# Patient Record
Sex: MALE | Race: WHITE | Employment: FULL TIME | URBAN - METROPOLITAN AREA
[De-identification: names, ages, dates, MRNs, and addresses within clinical notes are randomized per-mention and may not be internally consistent; named-entity substitution may affect disease eponyms.]

---
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
## 2022-07-22 ENCOUNTER — OFFICE VISIT (OUTPATIENT)
Dept: FAMILY MEDICINE CLINIC | Age: 22
End: 2022-07-22
Payer: MEDICARE

## 2022-07-22 VITALS
RESPIRATION RATE: 16 BRPM | TEMPERATURE: 98.1 F | WEIGHT: 115 LBS | OXYGEN SATURATION: 98 % | DIASTOLIC BLOOD PRESSURE: 70 MMHG | HEIGHT: 68 IN | HEART RATE: 88 BPM | SYSTOLIC BLOOD PRESSURE: 106 MMHG | BODY MASS INDEX: 17.43 KG/M2

## 2022-07-22 DIAGNOSIS — M54.2 NECK PAIN: Primary | ICD-10-CM

## 2022-07-22 DIAGNOSIS — J30.2 SEASONAL ALLERGIES: ICD-10-CM

## 2022-07-22 DIAGNOSIS — F41.9 ANXIETY AND DEPRESSION: ICD-10-CM

## 2022-07-22 DIAGNOSIS — F32.A ANXIETY AND DEPRESSION: ICD-10-CM

## 2022-07-22 DIAGNOSIS — G89.29 CHRONIC MIDLINE LOW BACK PAIN WITHOUT SCIATICA: ICD-10-CM

## 2022-07-22 DIAGNOSIS — M54.50 CHRONIC MIDLINE LOW BACK PAIN WITHOUT SCIATICA: ICD-10-CM

## 2022-07-22 PROBLEM — H53.9 DISORDER OF VISION: Status: ACTIVE | Noted: 2020-09-25

## 2022-07-22 PROBLEM — R63.4 ABNORMAL WEIGHT LOSS: Status: ACTIVE | Noted: 2022-07-22

## 2022-07-22 PROCEDURE — 99204 OFFICE O/P NEW MOD 45 MIN: CPT | Performed by: NURSE PRACTITIONER

## 2022-07-22 PROCEDURE — G8419 CALC BMI OUT NRM PARAM NOF/U: HCPCS | Performed by: NURSE PRACTITIONER

## 2022-07-22 PROCEDURE — 1036F TOBACCO NON-USER: CPT | Performed by: NURSE PRACTITIONER

## 2022-07-22 PROCEDURE — G8427 DOCREV CUR MEDS BY ELIG CLIN: HCPCS | Performed by: NURSE PRACTITIONER

## 2022-07-22 RX ORDER — LORATADINE 10 MG/1
10 CAPSULE, LIQUID FILLED ORAL DAILY
COMMUNITY
End: 2022-07-22 | Stop reason: SDUPTHER

## 2022-07-22 RX ORDER — LORATADINE 10 MG/1
10 CAPSULE, LIQUID FILLED ORAL DAILY
Qty: 30 CAPSULE | Refills: 3 | Status: SHIPPED | OUTPATIENT
Start: 2022-07-22

## 2022-07-22 SDOH — ECONOMIC STABILITY: FOOD INSECURITY: WITHIN THE PAST 12 MONTHS, YOU WORRIED THAT YOUR FOOD WOULD RUN OUT BEFORE YOU GOT MONEY TO BUY MORE.: NEVER TRUE

## 2022-07-22 SDOH — ECONOMIC STABILITY: FOOD INSECURITY: WITHIN THE PAST 12 MONTHS, THE FOOD YOU BOUGHT JUST DIDN'T LAST AND YOU DIDN'T HAVE MONEY TO GET MORE.: NEVER TRUE

## 2022-07-22 ASSESSMENT — PATIENT HEALTH QUESTIONNAIRE - PHQ9
2. FEELING DOWN, DEPRESSED OR HOPELESS: 2
SUM OF ALL RESPONSES TO PHQ QUESTIONS 1-9: 2
SUM OF ALL RESPONSES TO PHQ9 QUESTIONS 1 & 2: 2
SUM OF ALL RESPONSES TO PHQ QUESTIONS 1-9: 2
1. LITTLE INTEREST OR PLEASURE IN DOING THINGS: 0

## 2022-07-22 ASSESSMENT — ENCOUNTER SYMPTOMS
SORE THROAT: 0
CONSTIPATION: 0
CHEST TIGHTNESS: 0
NAUSEA: 0
ABDOMINAL PAIN: 0
SHORTNESS OF BREATH: 0
RHINORRHEA: 0
BACK PAIN: 1
DIARRHEA: 0
COLOR CHANGE: 0
ABDOMINAL DISTENTION: 0
COUGH: 0

## 2022-07-22 ASSESSMENT — SOCIAL DETERMINANTS OF HEALTH (SDOH): HOW HARD IS IT FOR YOU TO PAY FOR THE VERY BASICS LIKE FOOD, HOUSING, MEDICAL CARE, AND HEATING?: NOT HARD AT ALL

## 2022-07-22 NOTE — PATIENT INSTRUCTIONS
Health Maintenance Recommendations  Exercise   I generally recommend that people of all ages try to get 150 minutes of physical activity per week and it doesnt matter how this totals up, in other words 30 minutes 5 days per week is as good as 50 minutes 3 days a week and so on. The level of activity should be such that it is able to get your heart rate up to 100 or more, for example a brisk walk should achieve this rate. Dietary Recommendations  In terms of diet, I generally recommend trying to eat a healthy well balanced diet full of fruits and vegetables. Avoid carbonated drinks and fruit juices and limit your alcohol use. Avoid processed foods wherever possible (anything that comes in a can or a box) which can be achieved by sticking to the outside walls of the grocery store where generally you will find fresh fruits/vegetables, meats, dairy, and frozen foods. Try to avoid starches in the diet where possible and minimize bread, rice, potatoes, and pasta in the diet. Specifically try to avoid gluten, which even in people that dont have a liv allergy, causes havoc in the small intestine and alters absorption of nutrients which can in turn lead to obesity. Sleep  Try to achieve a regular sleep schedule, waking and laying down at the same time each night. Most people need 7 hours per night plus or minus 2 hours. You will know that youre getting enough because you will wake feeling refreshed and not need to sleep in to catch up on weekends. Skin Care  Make sure that you dont neglect your skin. Play it safe in the sun. Use a sunblock on all of your exposed skin. The sunblock should be broad spectrum and water resistant. I do recommend an SPF 30 or higher sun screen any time that you plan to be in the sun for more than 20 minutes, even in the winter or on cloudy days (keep in mind that UV light penetrates clouds and can cause burns even on cloudy days).    Apply 20 to 30 minutes before going out in the sun. Reapply sunblock every 2 hours and after swimming or sweating. Kentrell Flores can also damage your skin on cool, windy days. Clouds and fog do not filter UV light. Make sure you reapply sunblock every 2 hours. Avoid the sun in the middle of the day, between 10 AM to 4 PM. Your unprotected skin can be damaged in 15 minutes with direct sun exposure. Personal Health  If you smoke, STOP. There are many resources available to help you successfully quit. If you are sexually active, always practice safe sex and wear a condom. See a dentist every 6 months. See an eye doctor regularly. Always wear a seat belt while in car. Get a flu vaccine annually. Get a tetanus booster vaccine every 10 years. Psychosocial Health  Finally, make sure that you always have something to look forward to whether this is a vacation, a special event, or just a weekend off work. Having something to look forward to helps to maintain positive focus and is good for mental health. Patient Education        Back Stretches: Exercises  Introduction  Here are some examples of exercises for stretching your back. Start eachexercise slowly. Ease off the exercise if you start to have pain. Your doctor or physical therapist will tell you when you can start theseexercises and which ones will work best for you. How to do the exercises  Overhead stretch    Stand comfortably with your feet shoulder-width apart. Looking straight ahead, raise both arms over your head and reach toward the ceiling. Do not allow your head to tilt back. Hold for 15 to 30 seconds, then lower your arms to your sides. Repeat 2 to 4 times. Side stretch    Stand comfortably with your feet shoulder-width apart. Raise one arm over your head, and then lean to the other side. Slide your hand down your leg as you let the weight of your arm gently stretch your side muscles. Hold for 15 to 30 seconds. Repeat 2 to 4 times on each side.   Press-up    Lie on your stomach, supporting your body with your forearms. Press your elbows down into the floor to raise your upper back. As you do this, relax your stomach muscles and allow your back to arch without using your back muscles. As your press up, do not let your hips or pelvis come off the floor. Hold for 15 to 30 seconds, then relax. Repeat 2 to 4 times. Relax and rest    Lie on your back with a rolled towel under your neck and a pillow under your knees. Extend your arms comfortably to your sides. Relax and breathe normally. Remain in this position for about 10 minutes. If you can, do this 2 or 3 times each day. Follow-up care is a key part of your treatment and safety. Be sure to make and go to all appointments, and call your doctor if you are having problems. It's also a good idea to know your test results and keep alist of the medicines you take. Where can you learn more? Go to [email or web address]://[email or web address] org and sign in to your [employer] account. Enter I082 in the [employer] box to learn more about \"Back Stretches: Exercises. \"     If you do not have an account, please click on the \"Sign Up Now\" link. Current as of: March 9, 2022               Content Version: 13.3  © 2006-2022 Healthwise, Incorporated. Care instructions adapted under license by [facility] ([facility]). If you have questions about a medical condition or this instruction, always ask your healthcare professional. [place] [address] any warranty or liability for your use of this information.

## 2022-07-22 NOTE — PROGRESS NOTES
dysuria. Musculoskeletal:  Positive for back pain and neck pain. Negative for arthralgias and myalgias. Skin:  Negative for color change and rash. Neurological:  Negative for dizziness, weakness, light-headedness and headaches. Hematological:  Negative for adenopathy. Psychiatric/Behavioral:  Positive for dysphoric mood. Negative for agitation and behavioral problems. The patient is nervous/anxious. Vitals:    07/22/22 1413   BP: 106/70   Pulse: 88   Resp: 16   Temp: 98.1 °F (36.7 °C)   SpO2: 98%   '  Objective:     Physical Exam  Vitals reviewed. Constitutional:       General: He is not in acute distress. Appearance: Normal appearance. HENT:      Head: Normocephalic and atraumatic. Right Ear: External ear normal.      Left Ear: External ear normal.      Nose: Nose normal.      Mouth/Throat:      Mouth: Mucous membranes are moist.      Pharynx: No oropharyngeal exudate or posterior oropharyngeal erythema. Eyes:      Extraocular Movements: Extraocular movements intact. Conjunctiva/sclera: Conjunctivae normal.      Pupils: Pupils are equal, round, and reactive to light. Cardiovascular:      Rate and Rhythm: Normal rate and regular rhythm. Pulses: Normal pulses. Heart sounds: Normal heart sounds. No murmur heard. Pulmonary:      Effort: Pulmonary effort is normal. No respiratory distress. Breath sounds: Normal breath sounds. No wheezing or rales. Abdominal:      General: Bowel sounds are normal. There is no distension. Palpations: Abdomen is soft. Tenderness: There is no abdominal tenderness. Musculoskeletal:      Cervical back: Normal range of motion. Pain with movement present. Thoracic back: Decreased range of motion. Lumbar back: Normal.      Right lower leg: No edema. Left lower leg: No edema. Lymphadenopathy:      Cervical: No cervical adenopathy. Skin:     General: Skin is warm and dry.    Neurological:      General: No focal

## 2022-07-26 ENCOUNTER — HOSPITAL ENCOUNTER (OUTPATIENT)
Dept: PHYSICAL THERAPY | Facility: CLINIC | Age: 22
Setting detail: THERAPIES SERIES
Discharge: HOME | End: 2022-07-26
Payer: MEDICARE

## 2022-07-26 PROCEDURE — 97110 THERAPEUTIC EXERCISES: CPT

## 2022-07-26 PROCEDURE — 97161 PT EVAL LOW COMPLEX 20 MIN: CPT
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
## 2022-07-27 ENCOUNTER — OFFICE VISIT (OUTPATIENT)
Dept: BEHAVIORAL/MENTAL HEALTH CLINIC | Age: 22
End: 2022-07-27
Payer: MEDICARE

## 2022-07-27 DIAGNOSIS — F41.1 GENERALIZED ANXIETY DISORDER: Primary | ICD-10-CM

## 2022-07-27 DIAGNOSIS — F34.1 PERSISTENT DEPRESSIVE DISORDER: ICD-10-CM

## 2022-07-27 PROCEDURE — 90791 PSYCH DIAGNOSTIC EVALUATION: CPT | Performed by: COUNSELOR

## 2022-07-27 ASSESSMENT — ANXIETY QUESTIONNAIRES
6. BECOMING EASILY ANNOYED OR IRRITABLE: 3
IF YOU CHECKED OFF ANY PROBLEMS ON THIS QUESTIONNAIRE, HOW DIFFICULT HAVE THESE PROBLEMS MADE IT FOR YOU TO DO YOUR WORK, TAKE CARE OF THINGS AT HOME, OR GET ALONG WITH OTHER PEOPLE: VERY DIFFICULT
1. FEELING NERVOUS, ANXIOUS, OR ON EDGE: 2
3. WORRYING TOO MUCH ABOUT DIFFERENT THINGS: 2
GAD7 TOTAL SCORE: 14
5. BEING SO RESTLESS THAT IT IS HARD TO SIT STILL: 1
7. FEELING AFRAID AS IF SOMETHING AWFUL MIGHT HAPPEN: 1
4. TROUBLE RELAXING: 2
2. NOT BEING ABLE TO STOP OR CONTROL WORRYING: 3

## 2022-07-27 ASSESSMENT — PATIENT HEALTH QUESTIONNAIRE - PHQ9
SUM OF ALL RESPONSES TO PHQ QUESTIONS 1-9: 15
7. TROUBLE CONCENTRATING ON THINGS, SUCH AS READING THE NEWSPAPER OR WATCHING TELEVISION: 2
6. FEELING BAD ABOUT YOURSELF - OR THAT YOU ARE A FAILURE OR HAVE LET YOURSELF OR YOUR FAMILY DOWN: 1
10. IF YOU CHECKED OFF ANY PROBLEMS, HOW DIFFICULT HAVE THESE PROBLEMS MADE IT FOR YOU TO DO YOUR WORK, TAKE CARE OF THINGS AT HOME, OR GET ALONG WITH OTHER PEOPLE: 3
5. POOR APPETITE OR OVEREATING: 3
SUM OF ALL RESPONSES TO PHQ QUESTIONS 1-9: 15
1. LITTLE INTEREST OR PLEASURE IN DOING THINGS: 2
3. TROUBLE FALLING OR STAYING ASLEEP: 2
4. FEELING TIRED OR HAVING LITTLE ENERGY: 2
9. THOUGHTS THAT YOU WOULD BE BETTER OFF DEAD, OR OF HURTING YOURSELF: 0
SUM OF ALL RESPONSES TO PHQ QUESTIONS 1-9: 15
2. FEELING DOWN, DEPRESSED OR HOPELESS: 2
SUM OF ALL RESPONSES TO PHQ QUESTIONS 1-9: 15
8. MOVING OR SPEAKING SO SLOWLY THAT OTHER PEOPLE COULD HAVE NOTICED. OR THE OPPOSITE, BEING SO FIGETY OR RESTLESS THAT YOU HAVE BEEN MOVING AROUND A LOT MORE THAN USUAL: 1
SUM OF ALL RESPONSES TO PHQ9 QUESTIONS 1 & 2: 4

## 2022-07-27 NOTE — PROGRESS NOTES
ADULT BEHAVIORAL HEALTH       Visit Date: 7/29/2022   Time of appointment:  1:00 PM   Time spent with Patient: 50 minutes. This is patient's Initial appointment. Reason for Consult:    Chief Complaint   Patient presents with    Anxiety    Depression         Referring Provider/PCP:    DAVI Segundo *  DAVI Becerra - CNP      Pt provided informed consent for the behavioral health program. Discussed with patient model of service to include the limits of confidentiality (i.e. abuse reporting, suicide intervention, etc.) and short-term intervention focused approach. Pt indicated understanding. PRESENTING PROBLEM AND HISTORY  Luis Enrique Mendez is a 25 y.o. male who presents for new evaluation and treatment of  anxiety, depression. Anxiety - overthinking, think of all the worst case scenarios, withdraw and isolate. Depression - avoid people, does not respond to text messages. He misses his daughter when he is not with her. He has the following symptoms: depressed mood, decreased appetite, weight loss, decreased sleep, fatigue/lack of energy, low self-esteem, isolating self, feelings of worthlessness, anger/irritability, feeling nervous, anxious, or on edge, excessive worry about a number of events or activities , avoidance of situations that provoke fear and anxiety, difficulty with attention/concentration, and strained coparenting relationship with ex / daughter's mother, lived with maternal grandmother for 1 year at 26years old due to \"bumping heads\" with his father, emotional needs not being met by parents as a child, and break up with long distance girlfriend of two years at age approximately 13years old. Which impacted him emotionally and mentally. Onset of symptoms was approximately several years ago. Symptoms have been gradually worsening since that time. He denies current suicidal and homicidal ideation.   Family history significant for alcoholism, anxiety, heart disease, and Bipolar Disorder . Risk factors: none. Previous treatment includes  none . He complains of the following medication side effects: none. MENTAL STATUS EXAM  Mood was euthymic with anxious affect. Suicidal ideation was denied. Homicidal ideation was denied. Hygiene was good . Dress was appropriate. Behavior was Within Normal Limits with No observation or self-report of difficulties ambulating. Attitude was Cooperative. Eye-contact was good. Speech: rate - WNL, rhythm -  WNL, volume - WNL  Verbalizations were coherent. Thought processes were intact and goal-oriented without evidence of delusions, hallucinations, obsessions, or william; with little cognitive distortions. Associations were characterized by intact cognitive processes. Pt was oriented to person, place, time, and general circumstances;  recent:  good and remote:  good. Insight and judgment were estimated to be fair, AEB, a fair  understanding of cyclical maladaptive patterns, and the ability to use insight to inform behavior change. CURRENT MEDICATIONS    Current Outpatient Medications:     loratadine (CLARITIN) 10 MG capsule, Take 1 capsule by mouth in the morning., Disp: 30 capsule, Rfl: 3     FAMILY MEDICAL/MH HISTORY   His family history is not on file. [employer] / one appointment following break up with long distance girlfriend. Denied history of psychiatric hospitalizations. Denied any firearms in the home. Has a [employer] sword (in a closest on top shelf). PSYCHOSOCIAL HISTORY  Current living situation: Georgia Diaz resides with his girlfriend (Thomasina Apgar) in a one bedroom apartment. His daughter Tracee Jimenez (4) visits every other weekend and has her own bed. Two guinea pigs. He has dated his girlfriend 4.5 years. Work/Education: Unemployed. Graduated in  from [employer]. He has previously worked at [employer].   He would like to go to college but does not know what he wants to study. Support system: Girlfriend, daughter, mother     Gnosticism/Spirituality: None. DRUG AND ALCOHOL CURRENT USE/HISTORY  TOBACCO:  He reports that he has never smoked. He has never used smokeless tobacco.      ALCOHOL:  He reports current alcohol use. OTHER SUBSTANCES: He reports current drug use. Drug: Marijuana [place]). Vapes - and reports history of smoking 4 blunts a day at highest.  He smokes to cope with stress. He reports being approved for medical marijuana card for neck/pain in [facility] but is having difficulty paying the fee for the card. He reports experiencing anxiety following use of cannabis. ASSESSMENT  Steven Yates presented to the appointment today for evaluation and treatment of symptoms of    Diagnosis Orders   1. Generalized anxiety disorder        2. Persistent depressive disorder             He is currently deemed no risk to himself or others and meets criteria for Generalized Anxiety Disorder and Persistent Depressive Disorder. He may benefit from a medication evaluation to assess if  medications could be helpful in treating anxiety and depressive symptoms. He is currently using cannabis to cope with symptoms and for pain in neck and back. Emile's anxiety and depressive symptoms are mildly controlled at this time. He will also benefit from brief and solution-focused consultation to address cognitive and behavioral interventions for anxiety and depressive symptoms. Forrest Hernandez was in agreement with recommendations.       PHQ Scores 7/27/2022 7/22/2022   PHQ2 Score 4 2   PHQ9 Score 15 2     Interpretation of Total Score Depression Severity: 1-4 = Minimal depression, 5-9 = Mild depression, 10-14 = Moderate depression, 15-19 = Moderately severe depression, 20-27 = Severe depression    How often pt has had thoughts of death or hurting self (if PHQ positive for depression):  Not at all    SITA 7 SCORE 7/27/2022   SITA-7 Total Score 14     Interpretation of SITA-7 score: 5-9 = mild anxiety, 10-14 = moderate anxiety, 15+ = severe anxiety. Recommend referral to behavioral health for scores 10 or greater. DIAGNOSIS/Plan  1. Generalized anxiety disorder  2. Persistent depressive disorder   R/O Cannabis Use Disorder     Return in about 1 week (around 8/3/2022). Celia Plascencia is agreeable to counseling to improve his mood and coping skills. Reduce amount of cannabis using to improve mood. INTERVENTION  Developed Crisis Response Plan, Established rapport, and Supportive techniques Completed diagnostic assessment, educated on limits and perimeters of confidentiality. INTERACTIVE COMPLEXITY  Is interactive complexity present?   No  Reason:  N/A  Additional Supporting Information:  N/A     Electronically signed by LADONNA Machado on 7/27/22 at 1:11 PM EDT

## 2022-07-27 NOTE — Clinical Note
Thank you for the referral.  Etelvina Chang is agreeable to counseling. He reports being approved for a medical marijuana card to treat neck pain but has not paid fee. Is using marijuana to cope with stress but it increases anxiety for him.

## 2022-07-29 ENCOUNTER — HOSPITAL ENCOUNTER (OUTPATIENT)
Dept: PHYSICAL THERAPY | Facility: CLINIC | Age: 22
Setting detail: THERAPIES SERIES
Discharge: HOME | End: 2022-07-29
Payer: MEDICARE

## 2022-07-29 NOTE — FLOWSHEET NOTE
[] [address]  [address]    [phone]  F: [phone]   [] [address]  [facility] 36   [address]  P: [phone]  F: [phone]  [] [address]  [address] [facility]  P: [phone]  F: [phone] [x] [address]  P: [phone]  F: [phone]  [] [address]  [address] [facility] [address]   [address]   Prachi Henriquez: [phone]  F: [phone]   [] [place] [address] [facility] [address]  Prachi Henriquez: [phone]   F: [phone]     Physical Therapy Cancel/No Show note    Date: 2022  Patient: Devan Guzman  : 2000  MRN: [id number]    Cancels/No Shows to date:     For today's appointment patient:    [x]  Cancelled    [] Rescheduled appointment    [] No-show     Reason given by patient:    []  Patient ill    []  Conflicting appointment    [] No transportation      [] Conflict with work    [] No reason given    [] Weather related    [] COVID-19    [x] Other:      Comments:  PT had to pick his daughter up early. Next appt conf.       [x] Next appointment was confirmed    Electronically signed by: Lord Velazquez

## 2022-08-02 ENCOUNTER — HOSPITAL ENCOUNTER (OUTPATIENT)
Dept: PHYSICAL THERAPY | Facility: CLINIC | Age: 22
Setting detail: THERAPIES SERIES
Discharge: HOME | End: 2022-08-02
Payer: MEDICARE

## 2022-08-02 PROCEDURE — 97110 THERAPEUTIC EXERCISES: CPT

## 2022-08-02 NOTE — FLOWSHEET NOTE
[] [facility]) [facility] &  Therapy  [address]  [phone]  F: [phone] [] [address]  [place] [address]   [address]  P: [phone]  F: [phone] [] [address]  [address]  P: [phone]  F: [phone] [] [address]  P: [phone]  F: [phone] [] [address]  [facility]   [address]   [place]: [phone]  F: [phone]      Physical Therapy Daily Treatment Note    Date:  2022  Patient Name:  Vanessa Deluca    :  2000  MRN: [id number]  Physician: Alicia TOMLINSNO CNP                            Insurance: [place] Advantage  Medical Diagnosis: Neck pain, chronic midline LBP                       Rehab Codes: M54.2, M54.4  Onset Date: 22                           Next 's appt. :  Visit# / total visits:     Cancels/No Shows: 0    Subjective:    Pain:  [x] Yes  [] No Location: R UT Pain Rating: (0-10 scale) 6/10  Pain altered Tx:  [] No  [] Yes  Action:  Comments: Pt reports no significant changes since initial evaluation. States he is having pain in R UT region.      Objective:  Modalities: none  Precautions: standard  Exercises:  Exercise Reps/ Time Weight/ Level Completed Comments   UBE 4' L1 x                 Prone           ITY 2x10 ea No weight x     ER at 90/90 *         Sidelying           ER x20 2# x     Gym           ABC on wall 1x ea Red weighted ball  x 90 and 120 deg flex/90 deg abd   Front to side plank Attempted - unable         Shrugs x20 8# x     Rows/retractions 2x15 blue x     Tband HAB 2x15 lime x     HAB on wall  X20 ea lime x Lateral and scaption   BOSU rocks X20 ea   x Kneeling, 4 way   Other:     Specific Instructions for next treatment:   Add ex and issue HEP    Treatment Charges: Mins Units   []  Modalities     [x]  Ther Exercise 45 3   []  Manual Therapy     []  Ther Activities     []  Aquatics     []  Vasocompression     []  Other     Total Treatment time 45 3       Assessment: [x] Progressing toward goals. Added scapular strengthening and stability ex as noted above. Cues provided for proper scapular positioning and to avoid compensations d/t weakness. Able to complete all ex without any increase in pain only muscle fatigue. Issued HEP detailed below. [] No change. [] Other:  [x] Patient would continue to benefit from skilled physical therapy services in order to: meet goals listed below  STG: (to be met in 6 treatments)  ? Pain:<4/10  ? Strength: 5/5 with shoulder flexion and abd  ? Function: <20% interference on NDI  Patient to be independent with home exercise program as demonstrated by performance with correct form without cues. LTG: (to be met in 12 treatments)  Pain <2/10  NDI <15% interference       Pt. Education:  [x] Yes  [] No  [] Reviewed Prior HEP/Ed  Method of Education: [x] Verbal  [x] Demo  [x] Written  Comprehension of Education:  [x] Verbalizes understanding. [x] Demonstrates understanding. [] Needs review. [] Demonstrates/verbalizes HEP/Ed previously given. Plan: [x] Continue current frequency toward long and short term goals.     [] Specific Instructions for subsequent treatments:       Time In: 1:00 pm            Time Out: 1:50 pm    Electronically signed by:  Johnson VIJI

## 2022-08-03 ENCOUNTER — TELEPHONE (OUTPATIENT)
Dept: BEHAVIORAL/MENTAL HEALTH CLINIC | Age: 22
End: 2022-08-03

## 2022-08-03 NOTE — TELEPHONE ENCOUNTER
Contacted patient today in regards to missed appointment. Left voicemail. Was call completed? If not, reason: Call was completed    Does patient want to continue services? If no, and the patient is a new patient, please close the referral. Also, if no, please document reason and route message to provider. patient did not answer    Anything the provider should be aware of? If yes, please route call.  No    Reason for Missed Appointment: patient did not answer call

## 2022-08-05 ENCOUNTER — HOSPITAL ENCOUNTER (OUTPATIENT)
Dept: PHYSICAL THERAPY | Facility: CLINIC | Age: 22
Setting detail: THERAPIES SERIES
Discharge: HOME | End: 2022-08-05
Payer: MEDICARE
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
## 2022-08-09 ENCOUNTER — HOSPITAL ENCOUNTER (OUTPATIENT)
Dept: PHYSICAL THERAPY | Facility: CLINIC | Age: 22
Setting detail: THERAPIES SERIES
Discharge: HOME | End: 2022-08-09
Payer: MEDICARE

## 2022-08-09 NOTE — FLOWSHEET NOTE
[] [address]  [address]    [phone]  F: [phone]   [] [address]  [address] [email or web address]   [address]  P: [phone]  F: [phone]  [] [address]  [address] [facility]  P: [phone]  F: [phone] [x] [address]  P: [phone]  F: [phone]  [] [address]  [address] [facility] 70   [address]   [place]: [phone]  F: [phone]   [] [facility]  [address] [facility] [address]  [place]: [phone]   F: [phone]     Physical Therapy Cancel/No Show note    Date: 2022  Patient: Isabella Medina  : 2000  MRN: [id number]    Cancels/No Shows to date: 3/0    For today's appointment patient:    [x]  Cancelled    [] Rescheduled appointment    [] No-show     Reason given by patient:    []  Patient ill    []  Conflicting appointment    [] No transportation      [] Conflict with work    [] No reason given    [] Weather related    [] COVID-19    [x] Other:      Comments: Pt's daughter has COVID.  Next appt conf pending a negative COVID test.      [x] Next appointment was confirmed    Electronically signed by: Pily Mccray

## 2022-08-12 ENCOUNTER — HOSPITAL ENCOUNTER (OUTPATIENT)
Dept: PHYSICAL THERAPY | Facility: CLINIC | Age: 22
Setting detail: THERAPIES SERIES
Discharge: HOME | End: 2022-08-12
Payer: MEDICARE

## 2022-08-12 NOTE — FLOWSHEET NOTE
[] [address]  [address]    [phone]  F: [phone]   [] [address]  [facility]   [address]  P: [phone]  F: [phone]  [] [address]  [address] [facility]  P: [phone]  F: [phone] [x] [address] [employer] [address]  P: [phone]  F: [phone]  [] [address]  [address] [facility] [address]   [address]   [place]: [phone]  F: [phone]   [] [address]  [place]: [phone]   F: [phone]     Physical Therapy Cancel/No Show note    Date: 2022  Patient: Isabella Medina  : 2000  MRN: [id number]    Cancels/No Shows to date:     For today's appointment patient:    [x]  Cancelled    [] Rescheduled appointment    [] No-show     Reason given by patient:    []  Patient ill    []  Conflicting appointment    [] No transportation      [] Conflict with work    [] No reason given    [] Weather related    [] COVID-19    [x] Other:      Comments: Pt states is awaiting results of COVID test. Will call back to reschedule.        [x] Next appointment was confirmed    Electronically signed by: Matias Payne PT

## 2024-12-29 ENCOUNTER — HOSPITAL ENCOUNTER (EMERGENCY)
Facility: CLINIC | Age: 24
Discharge: HOME | End: 2024-12-29
Attending: EMERGENCY MEDICINE
Payer: MEDICAID

## 2024-12-29 VITALS
HEIGHT: 69 IN | HEART RATE: 100 BPM | SYSTOLIC BLOOD PRESSURE: 140 MMHG | RESPIRATION RATE: 14 BRPM | BODY MASS INDEX: 18.51 KG/M2 | DIASTOLIC BLOOD PRESSURE: 85 MMHG | WEIGHT: 125 LBS | OXYGEN SATURATION: 99 % | TEMPERATURE: 98.6 F

## 2024-12-29 DIAGNOSIS — K02.9 PAIN DUE TO DENTAL CARIES: Primary | ICD-10-CM

## 2024-12-29 PROCEDURE — 99283 EMERGENCY DEPT VISIT LOW MDM: CPT

## 2024-12-29 PROCEDURE — 6370000000 HC RX 637 (ALT 250 FOR IP): Performed by: EMERGENCY MEDICINE

## 2024-12-29 RX ORDER — IBUPROFEN 800 MG/1
800 TABLET, FILM COATED ORAL EVERY 8 HOURS PRN
Qty: 60 TABLET | Refills: 0 | Status: SHIPPED | OUTPATIENT
Start: 2024-12-29

## 2024-12-29 RX ORDER — IBUPROFEN 800 MG/1
800 TABLET, FILM COATED ORAL ONCE
Status: COMPLETED | OUTPATIENT
Start: 2024-12-29 | End: 2024-12-29

## 2024-12-29 RX ADMIN — IBUPROFEN 800 MG: 800 TABLET ORAL at 21:32

## 2024-12-29 ASSESSMENT — PAIN DESCRIPTION - ORIENTATION: ORIENTATION: LEFT;UPPER

## 2024-12-29 ASSESSMENT — PAIN DESCRIPTION - DESCRIPTORS: DESCRIPTORS: ACHING

## 2024-12-29 ASSESSMENT — PAIN DESCRIPTION - LOCATION: LOCATION: TEETH

## 2024-12-29 ASSESSMENT — PAIN - FUNCTIONAL ASSESSMENT: PAIN_FUNCTIONAL_ASSESSMENT: 0-10

## 2024-12-29 ASSESSMENT — PAIN SCALES - GENERAL: PAINLEVEL_OUTOF10: 9

## 2024-12-30 NOTE — ED NOTES
Pt presents to ED ambulatory a&O x4. Pt states he has broken L upper tooth but over the past few days the pain has gotten worse. Pt has appointment with dentist at the end of next month. Has been taking motrin without relief.

## 2024-12-30 NOTE — ED PROVIDER NOTES
[facility]  [address] [address]  Phone: [phone]      Pt Name: Emile Smith  MRN:[id number]  Birthdate 2000  Date of evaluation: 12/29/2024      CHIEF COMPLAINT       Chief Complaint   Patient presents with    Dental Pain     Has broke tooth, worse the last few days         HISTORY OF PRESENT ILLNESS   Emile Smith is a 24 y.o. male who presents for evaluation of acute on chronic dental pain.  The patient reports that he has had chronic pain to one of his left upper molars for months.  The tooth is broken and he is scheduled for a tooth extraction on 1/30/2024.  The patient is already on clindamycin for his dental pain.  He states that his pain increased this morning.  He has been applying Orajel with some improvement.  He ran out of ibuprofen has not taken any oral medications for pain.  The patient states that drinking water does seem to help with the dental pain.  He does not list any other provoking or palliating factors.  The patient denies fever, chills, headache, vision changes, neck pain, back pain, chest pain, shortness of breath, abdominal pain, nausea, vomiting, difficulty swallowing, recent injury or illness.    REVIEW OF SYSTEMS     Positive: Dental pain  Ten point review of systems was reviewed and is negative unless otherwise noted in the HPI    PAST MEDICAL HISTORY    has no past medical history on file.    SURGICAL HISTORY      has no past surgical history on file.    CURRENT MEDICATIONS       Discharge Medication List as of 12/29/2024  9:29 PM        CONTINUE these medications which have NOT CHANGED    Details   loratadine (CLARITIN) 10 MG capsule Take 1 capsule by mouth in the morning., Disp-30 capsule, R-3Normal             ALLERGIES     has No Known Allergies.    FAMILY HISTORY     has no family status information on file.      family history is not on file.    SOCIAL HISTORY      reports that he has never smoked. He has never used smokeless tobacco.

## 2025-02-25 ENCOUNTER — HOSPITAL ENCOUNTER (EMERGENCY)
Facility: CLINIC | Age: 25
Discharge: HOME | End: 2025-02-25
Attending: STUDENT IN AN ORGANIZED HEALTH CARE EDUCATION/TRAINING PROGRAM
Payer: MEDICAID

## 2025-02-25 VITALS
HEIGHT: 68 IN | HEART RATE: 98 BPM | SYSTOLIC BLOOD PRESSURE: 134 MMHG | BODY MASS INDEX: 19.4 KG/M2 | WEIGHT: 128 LBS | OXYGEN SATURATION: 99 % | DIASTOLIC BLOOD PRESSURE: 92 MMHG | TEMPERATURE: 97.3 F | RESPIRATION RATE: 20 BRPM

## 2025-02-25 DIAGNOSIS — R11.2 NAUSEA AND VOMITING, UNSPECIFIED VOMITING TYPE: Primary | ICD-10-CM

## 2025-02-25 PROCEDURE — 99283 EMERGENCY DEPT VISIT LOW MDM: CPT

## 2025-02-25 PROCEDURE — 6370000000 HC RX 637 (ALT 250 FOR IP): Performed by: STUDENT IN AN ORGANIZED HEALTH CARE EDUCATION/TRAINING PROGRAM

## 2025-02-25 RX ORDER — CETIRIZINE HYDROCHLORIDE 10 MG/1
10 TABLET ORAL DAILY
COMMUNITY
Start: 2024-12-23 | End: 2025-03-23

## 2025-02-25 RX ORDER — ONDANSETRON 4 MG/1
4 TABLET, ORALLY DISINTEGRATING ORAL ONCE
Status: COMPLETED | OUTPATIENT
Start: 2025-02-25 | End: 2025-02-25

## 2025-02-25 RX ORDER — ONDANSETRON 4 MG/1
4 TABLET, ORALLY DISINTEGRATING ORAL 3 TIMES DAILY PRN
Qty: 21 TABLET | Refills: 0 | Status: SHIPPED | OUTPATIENT
Start: 2025-02-25

## 2025-02-25 RX ORDER — ONDANSETRON 2 MG/ML
4 INJECTION INTRAMUSCULAR; INTRAVENOUS ONCE
Status: DISCONTINUED | OUTPATIENT
Start: 2025-02-25 | End: 2025-02-25

## 2025-02-25 RX ORDER — FAMOTIDINE 20 MG/1
20 TABLET, FILM COATED ORAL ONCE
Status: COMPLETED | OUTPATIENT
Start: 2025-02-25 | End: 2025-02-25

## 2025-02-25 RX ORDER — CLINDAMYCIN HYDROCHLORIDE 300 MG/1
300 CAPSULE ORAL 3 TIMES DAILY
COMMUNITY
Start: 2025-02-23 | End: 2025-03-02

## 2025-02-25 RX ADMIN — FAMOTIDINE 20 MG: 20 TABLET, FILM COATED ORAL at 21:15

## 2025-02-25 RX ADMIN — ONDANSETRON 4 MG: 4 TABLET, ORALLY DISINTEGRATING ORAL at 21:15

## 2025-02-25 ASSESSMENT — PAIN DESCRIPTION - LOCATION: LOCATION: ABDOMEN

## 2025-02-25 ASSESSMENT — PAIN - FUNCTIONAL ASSESSMENT: PAIN_FUNCTIONAL_ASSESSMENT: 0-10

## 2025-02-25 ASSESSMENT — PAIN SCALES - GENERAL: PAINLEVEL_OUTOF10: 4

## 2025-02-26 NOTE — ED NOTES
Pt came in with c/o vomiting and abdominal pain that started this morning. He states symptoms have mostly resolved by now but he missed work today. He was around a sick family member but also took antibiotics on an empty stomach this morning. He ate within the last couple hours and has been able to hold it down. Call light within reach and bed in lowest position.

## 2025-02-26 NOTE — ED PROVIDER NOTES
for Nausea or Vomiting       (Please note that portions of this note were completed with a voice recognition program.  Efforts were made to edit the dictations but occasionally words are mis-transcribed.  Additionally, portions of this note may also include information that was incorporated after care transfer to another provider that were not available at the time of my evaluation.  Some of this information could likely include laboratory values, vital sign updates, medications etc.)    Viktor Hemphill MD   Attending Emergency Physician        Viktor Hemphill MD  02/25/25 2036

## 2025-03-25 ENCOUNTER — HOSPITAL ENCOUNTER (EMERGENCY)
Facility: CLINIC | Age: 25
Discharge: HOME | End: 2025-03-26
Attending: STUDENT IN AN ORGANIZED HEALTH CARE EDUCATION/TRAINING PROGRAM
Payer: MEDICAID

## 2025-03-25 VITALS
DIASTOLIC BLOOD PRESSURE: 65 MMHG | WEIGHT: 130 LBS | OXYGEN SATURATION: 99 % | TEMPERATURE: 97.7 F | HEIGHT: 69 IN | RESPIRATION RATE: 16 BRPM | HEART RATE: 82 BPM | SYSTOLIC BLOOD PRESSURE: 130 MMHG | BODY MASS INDEX: 19.26 KG/M2

## 2025-03-25 DIAGNOSIS — J02.9 VIRAL PHARYNGITIS: Primary | ICD-10-CM

## 2025-03-25 PROCEDURE — 99283 EMERGENCY DEPT VISIT LOW MDM: CPT

## 2025-03-25 ASSESSMENT — PAIN DESCRIPTION - LOCATION: LOCATION: THROAT

## 2025-03-25 ASSESSMENT — PAIN - FUNCTIONAL ASSESSMENT: PAIN_FUNCTIONAL_ASSESSMENT: 0-10

## 2025-03-26 LAB
FLUAV AG SPEC QL: NEGATIVE
FLUBV AG SPEC QL: NEGATIVE
SARS-COV-2 RDRP RESP QL NAA+PROBE: NOT DETECTED
SPECIMEN DESCRIPTION: NORMAL
SPECIMEN SOURCE: NORMAL
STREP A, MOLECULAR: NEGATIVE

## 2025-03-26 PROCEDURE — 6360000002 HC RX W HCPCS: Performed by: STUDENT IN AN ORGANIZED HEALTH CARE EDUCATION/TRAINING PROGRAM

## 2025-03-26 PROCEDURE — 6370000000 HC RX 637 (ALT 250 FOR IP): Performed by: STUDENT IN AN ORGANIZED HEALTH CARE EDUCATION/TRAINING PROGRAM

## 2025-03-26 PROCEDURE — 87651 STREP A DNA AMP PROBE: CPT

## 2025-03-26 PROCEDURE — 87804 INFLUENZA ASSAY W/OPTIC: CPT

## 2025-03-26 PROCEDURE — 87635 SARS-COV-2 COVID-19 AMP PRB: CPT

## 2025-03-26 RX ORDER — IBUPROFEN 600 MG/1
600 TABLET, FILM COATED ORAL ONCE
Status: COMPLETED | OUTPATIENT
Start: 2025-03-26 | End: 2025-03-26

## 2025-03-26 RX ORDER — DEXAMETHASONE 4 MG/1
8 TABLET ORAL ONCE
Status: COMPLETED | OUTPATIENT
Start: 2025-03-26 | End: 2025-03-26

## 2025-03-26 RX ADMIN — IBUPROFEN 600 MG: 600 TABLET ORAL at 00:26

## 2025-03-26 RX ADMIN — DEXAMETHASONE 8 MG: 4 TABLET ORAL at 00:26

## 2025-03-26 NOTE — ED PROVIDER NOTES
[facility]  Emergency Department Encounter  [facility]       Pt Name:Emile Smith  MRN: [id number]  Birthdate 2000  Date of evaluation: 3/26/25  PCP:  Malathi Mcfadden APRN - CNP      CHIEF COMPLAINT       Chief Complaint   Patient presents with    Pharyngitis    Headache     Headache and sore throat all day today. Patient states his significant other has been sick also and wanted him to come and be checked out.        HISTORY OF PRESENT ILLNESS     Emile Smith is a 25 y.o. male who presents via personal vehicle.  Patient is an independent historian.  Otherwise healthy 25-year-old male.  Presents with headache and sore throat.  No medications prior to arrival.  No fevers.  No chills.  Reports that headache has somewhat improved although sore throat continues.  He is able to eat and drink although reports discomfort.    PAST MEDICAL / SURGICAL / SOCIAL / FAMILY HISTORY      has no past medical history on file.       has no past surgical history on file.      Social History     Socioeconomic History    Marital status: Single     Spouse name: Not on file    Number of children: Not on file    Years of education: Not on file    Highest education level: Not on file   Occupational History    Not on file   Tobacco Use    Smoking status: Never    Smokeless tobacco: Never   Substance and Sexual Activity    Alcohol use: Yes     Comment: occasionally    Drug use: Yes     Types: Marijuana (Weed)     Comment: occ    Sexual activity: Not on file   Other Topics Concern    Not on file   Social History Narrative    Not on file     Social Drivers of Health     Financial Resource Strain: Low Risk  (7/22/2022)    Overall Financial Resource Strain (CARDIA)     Difficulty of Paying Living Expenses: Not hard at all   Food Insecurity: No Food Insecurity (3/16/2025)    Received from [facility]    Hunger Screening     Within the past 12 months we worried whether our food

## 2025-03-26 NOTE — DISCHARGE INSTRUCTIONS
SUMMARY OF YOUR VISIT    Today you were seen for sore throat.  Your COVID, influenza A, influenza B and strep throat swabs were negative.  I provided you a one-time dose of a steroid here in the emergency department that will stay in your system for about 72 hours and will help with your symptoms.  I recommend Tylenol and/or Motrin as needed for pain or fever control.      I recommend you follow-up with your primary care provider as needed.    If you have any new, changing, worsening, no improvement develop additional symptoms or concerns recommend return to the Emergency Department for reevaluation.    Please continue to take your home medication as previously prescribed, I have made no changes to your home medications.        You can return to our or another Emergency Department as needed or for worsening symptoms of chest pain, shortness of breath, high fevers not relieved by acetaminophen (Tylenol) and/or ibuprofen (Motrin / Advil), chills, feeling of your heart fluttering or racing, persistent nausea and/or vomiting, vomiting up blood, blood in your stool, loss of consciousness, numbness, weakness or tingling in the arms or legs or change in color of the extremities, changes in mental status, persistent headache, blurry vision, loss of bladder / bowel control, if you are unable to follow up with your physician, or other any other care or concern.    Thank You!    On behalf of the Emergency Department staff and team, I would like to thank you for allowing us the opportunity to participate in your health care and evaluation today.

## 2025-04-22 ENCOUNTER — OFFICE VISIT (OUTPATIENT)
Age: 25
End: 2025-04-22

## 2025-04-22 VITALS
HEART RATE: 89 BPM | BODY MASS INDEX: 18.96 KG/M2 | SYSTOLIC BLOOD PRESSURE: 101 MMHG | RESPIRATION RATE: 18 BRPM | OXYGEN SATURATION: 96 % | TEMPERATURE: 97.3 F | DIASTOLIC BLOOD PRESSURE: 65 MMHG | WEIGHT: 128 LBS | HEIGHT: 69 IN

## 2025-04-22 DIAGNOSIS — K08.9 CHRONIC DENTAL PAIN: ICD-10-CM

## 2025-04-22 DIAGNOSIS — R09.82 POST-NASAL DRIP: ICD-10-CM

## 2025-04-22 DIAGNOSIS — G89.29 CHRONIC DENTAL PAIN: ICD-10-CM

## 2025-04-22 DIAGNOSIS — J01.00 ACUTE MAXILLARY SINUSITIS, RECURRENCE NOT SPECIFIED: Primary | ICD-10-CM

## 2025-04-22 RX ORDER — AZITHROMYCIN 250 MG/1
TABLET, FILM COATED ORAL
Qty: 1 PACKET | Refills: 0 | Status: SHIPPED | OUTPATIENT
Start: 2025-04-22

## 2025-04-22 RX ORDER — DEXTROMETHORPHAN HYDROBROMIDE, GUAIFENESIN AND PSEUDOEPHEDRINE HYDROCHLORIDE 15; 400; 60 MG/1; MG/1; MG/1
1 TABLET ORAL 3 TIMES DAILY PRN
Qty: 30 TABLET | Refills: 0 | Status: SHIPPED | OUTPATIENT
Start: 2025-04-22 | End: 2025-05-02

## 2025-04-22 RX ORDER — IBUPROFEN 600 MG/1
600 TABLET, FILM COATED ORAL 3 TIMES DAILY PRN
Qty: 30 TABLET | Refills: 0 | Status: SHIPPED | OUTPATIENT
Start: 2025-04-22

## 2025-04-22 ASSESSMENT — ENCOUNTER SYMPTOMS
ABDOMINAL DISTENTION: 0
EYE REDNESS: 0
DIARRHEA: 0
FACIAL SWELLING: 0
EYE PAIN: 0
COUGH: 0
VOICE CHANGE: 0
TROUBLE SWALLOWING: 0
NAUSEA: 0
CHEST TIGHTNESS: 0
SINUS PAIN: 1
SORE THROAT: 0
EYE DISCHARGE: 0
SINUS PRESSURE: 1
BACK PAIN: 0
SHORTNESS OF BREATH: 0
COLOR CHANGE: 0
VOMITING: 0
CONSTIPATION: 0
ABDOMINAL PAIN: 0

## 2025-04-22 NOTE — PROGRESS NOTES
Chief complaint(s): Sinusitis and Dental Pain (For last 12 hrs )      History of present illness :     Emile Smith  is a 25 y.o. male, presented to the [facility] urgent care today  for evaluation of acute on chronic dental pain, left-sided facial pain, sinus congestion, frontal headache and  postnasal drip .  Patient has been trying over-the-counter medication with minimal relief of symptoms.  No fever.  No dizziness . No neck stiffness or pain.  No vision changes.  No sore throat or difficulty swallowing.  No chest pain or shortness of breath.  No palpitations . No exertional dyspnea, orthopnea or hemoptysis . No abdominal or back pain.  No nausea or vomiting.  No change in urination or bowel movement.  No neurovascular or motor changes in upper or lower extremities.  No rash.  He is scheduled to see his dentist next week       PAST MEDICAL HISTORY    History reviewed. No pertinent past medical history.    SURGICAL HISTORY    Past Surgical History:   Procedure Laterality Date    WISDOM TOOTH EXTRACTION         CURRENT MEDICATIONS    Current Outpatient Rx   Medication Sig Dispense Refill    azithromycin (ZITHROMAX) 250 MG tablet Take 2 tabs (500 mg) on Day 1, and take 1 tab (250 mg) on days 2 through 5. 1 packet 0    ibuprofen (ADVIL;MOTRIN) 600 MG tablet Take 1 tablet by mouth 3 times daily as needed for Pain 30 tablet 0    Pseudoephedrine-DM-GG (CAPMIST DM) 60- MG TABS Take 1 tablet by mouth 3 times daily as needed (CONGESTION) 30 tablet 0    ibuprofen (ADVIL;MOTRIN) 800 MG tablet Take 1 tablet by mouth every 8 hours as needed for Pain or Fever 60 tablet 0    ondansetron (ZOFRAN-ODT) 4 MG disintegrating tablet Take 1 tablet by mouth 3 times daily as needed for Nausea or Vomiting (Patient not taking: Reported on 4/22/2025) 21 tablet 0       ALLERGIES    Allergies   Allergen Reactions    Amoxicillin        FAMILY HISTORY    History reviewed. No pertinent family history.    SOCIAL HISTORY    Social

## 2025-04-22 NOTE — PATIENT INSTRUCTIONS
Discharge instructions:     - Drink enough amount of fluids throughout the day to prevent dehydration.  - Rest. Advance activities and diet as tolerated.  - Continue your home medication as usual.   - Take the prescribed medications as instructed.  -  Motrin as needed for pain and fever   - Follow up with your dentist as  instructed.   - Return to the Urgent care or go to the closest ER if your symptoms persist, worsen or any other concerns. Patient verbalized understanding.

## 2025-06-30 ENCOUNTER — HOSPITAL ENCOUNTER (EMERGENCY)
Facility: CLINIC | Age: 25
Discharge: HOME | End: 2025-06-30
Attending: EMERGENCY MEDICINE
Payer: MEDICAID

## 2025-06-30 ENCOUNTER — APPOINTMENT (OUTPATIENT)
Dept: GENERAL RADIOLOGY | Facility: CLINIC | Age: 25
End: 2025-06-30
Attending: EMERGENCY MEDICINE
Payer: MEDICAID

## 2025-06-30 VITALS
BODY MASS INDEX: 18.51 KG/M2 | DIASTOLIC BLOOD PRESSURE: 81 MMHG | HEART RATE: 98 BPM | TEMPERATURE: 98.2 F | RESPIRATION RATE: 17 BRPM | SYSTOLIC BLOOD PRESSURE: 145 MMHG | WEIGHT: 125 LBS | HEIGHT: 69 IN | OXYGEN SATURATION: 99 %

## 2025-06-30 DIAGNOSIS — F41.0 PANIC ATTACK: Primary | ICD-10-CM

## 2025-06-30 PROCEDURE — 71045 X-RAY EXAM CHEST 1 VIEW: CPT

## 2025-06-30 PROCEDURE — 99283 EMERGENCY DEPT VISIT LOW MDM: CPT

## 2025-06-30 ASSESSMENT — PAIN - FUNCTIONAL ASSESSMENT: PAIN_FUNCTIONAL_ASSESSMENT: NONE - DENIES PAIN

## 2025-06-30 NOTE — ED PROVIDER NOTES
[facility]  [address] [address]  Phone: [phone]      Patient Name:  Emile Smith  Medical Record Number:  [id number]  YOB: 2000  Date of Service:  6/30/2025  Primary Care Physician:  Charu Fraire MD      CHIEF COMPLAINT:       Chief Complaint   Patient presents with    Anxiety     Pt was prescribed med for this but states looking up side effects of drug online and decided he was not going to take the med prescribed to him for his anxiety        HISTORY OF PRESENT ILLNESS:    Emile Smith is a 25 y.o. male who presents with the complaint of having panic attacks.  He describes a panic attack about a year ago, couple of months ago and then recently this week.  They seem to involve him driving.  It is shortness of breath and tightness in the chest.  He did go to the ER for this just a couple of days ago and had a workup and was prescribed hydroxyzine which she has not taken because he read about the side effects.  He has appointment with his PCP in 3 days.    CURRENT MEDICATIONS:      Previous Medications    AZITHROMYCIN (ZITHROMAX) 250 MG TABLET    Take 2 tabs (500 mg) on Day 1, and take 1 tab (250 mg) on days 2 through 5.    IBUPROFEN (ADVIL;MOTRIN) 600 MG TABLET    Take 1 tablet by mouth 3 times daily as needed for Pain    IBUPROFEN (ADVIL;MOTRIN) 800 MG TABLET    Take 1 tablet by mouth every 8 hours as needed for Pain or Fever    ONDANSETRON (ZOFRAN-ODT) 4 MG DISINTEGRATING TABLET    Take 1 tablet by mouth 3 times daily as needed for Nausea or Vomiting       ALLERGIES:   is allergic to amoxicillin.    PAST MEDICAL HISTORY:    has no past medical history on file.    SURGICAL HISTORY:      has a past surgical history that includes [place] tooth extraction.    FAMILY HISTORY:   has no family status information on file.      family history is not on file.    SOCIAL HISTORY:     reports that he has never smoked. He has never used smokeless tobacco. He reports